# Patient Record
Sex: FEMALE
[De-identification: names, ages, dates, MRNs, and addresses within clinical notes are randomized per-mention and may not be internally consistent; named-entity substitution may affect disease eponyms.]

---

## 2018-04-04 ENCOUNTER — HOSPITAL ENCOUNTER (EMERGENCY)
Dept: HOSPITAL 89 - ER | Age: 23
Discharge: HOME | End: 2018-04-04
Payer: COMMERCIAL

## 2018-04-04 VITALS — DIASTOLIC BLOOD PRESSURE: 69 MMHG | SYSTOLIC BLOOD PRESSURE: 101 MMHG

## 2018-04-04 DIAGNOSIS — S06.0X0A: Primary | ICD-10-CM

## 2018-04-04 PROCEDURE — 81025 URINE PREGNANCY TEST: CPT

## 2018-04-04 PROCEDURE — 70450 CT HEAD/BRAIN W/O DYE: CPT

## 2018-04-04 PROCEDURE — 99283 EMERGENCY DEPT VISIT LOW MDM: CPT
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
## 2018-04-04 NOTE — RADIOLOGY IMAGING REPORT
FACILITY: [facility] [place] 

 

PATIENT NAME: Mamadou Chen

: 1995

MR: [id number]

V: [id number]

EXAM DATE: 

ORDERING PHYSICIAN: RUSLAN NOLASCO

TECHNOLOGIST: 

 

[facility]

Patient: Mamadou Chen

: 1995

MRN: [id number]

Visit/Account:[id number]

Date of Sevice:  2018

 

ACCESSION #: [id number]

 

EXAMINATION:   Head CT without intravenous contrast

 

HISTORY: Trauma

 

TECHNIQUE:   Contiguous axial images were obtained from the skull base to the vertex without intraven
ous contrast.  Sagittal and coronal reformatted images are also submitted. Dose Lowering Technique

 

One of the following dose optimization techniques was utilized in the performance of this exam: Autom
ated exposure control; adjustment of the mA and/or kV according to the patient's size; or use of an i
terative  reconstruction technique.  Specific details can be referenced in the facility's radiology C
T exam operational policy.

 

 

 

COMPARISON:  None.

 

FINDINGS:

 

Brain volume:  Normal.

 

Ventricles:  Normal.

 

Acute ischemic changes:  None.

 

Hemorrhage:  None.

 

Masses / edema:  None.

 

Gray-white: Negative.

 

White matter:  Normal.

 

Vessels:  Negative.

 

Extra-axial:  Negative.

 

Calvarium / scalp:  Negative.

 

Skull base / visualized face:  Negative.

 

Visualized sinuses / orbits:  Negative.

 

IMPRESSION:

Normal noncontrast head CT without evidence of mass lesion, acute infarct or hemorrhage.

 

Report Dictated By: Bruna Devine MD at 2018 5:22 PM

 

Report E-Signed By: Bruna Devine MD  at 2018 5:24 PM

 

WSN:[id number]